# Patient Record
Sex: FEMALE | URBAN - NONMETROPOLITAN AREA
[De-identification: names, ages, dates, MRNs, and addresses within clinical notes are randomized per-mention and may not be internally consistent; named-entity substitution may affect disease eponyms.]

---

## 2020-03-18 ENCOUNTER — IMPORTED ENCOUNTER (OUTPATIENT)
Dept: URBAN - NONMETROPOLITAN AREA CLINIC 1 | Facility: CLINIC | Age: 35
End: 2020-03-18

## 2020-03-18 PROBLEM — H52.13: Noted: 2020-03-18

## 2020-03-18 PROCEDURE — 92004 COMPRE OPH EXAM NEW PT 1/>: CPT

## 2020-03-18 NOTE — PATIENT DISCUSSION
*LASIK:.-  Recc LASIK sx OU.-  Risks and Benefits discussed with patient including infection bleeding loss of vision flap complications mechanical failure and over and under correction. The entire procedure was explained to the patient from start to finish and all pts questions were answered.-Pt desires to proceed with surgery and understands realistic expectations. -Valium RX given to patient in office today by Jay Fitzgerald to schedule procedure task sent to her.

## 2022-04-16 ASSESSMENT — VISUAL ACUITY
OS_SC: 20/20
OD_SC: 20/20
OS_CC: CF4'

## 2022-04-16 ASSESSMENT — TONOMETRY
OD_IOP_MMHG: 14
OS_IOP_MMHG: 14

## 2022-04-16 ASSESSMENT — KERATOMETRY
OD_AXISANGLE_DEGREES: 096
OS_K1POWER_DIOPTERS: 44.23
OD_K1POWER_DIOPTERS: 44.23
OD_K2POWER_DIOPTERS: 44.64
OS_K2POWER_DIOPTERS: 45.30
OS_AXISANGLE_DEGREES: 072

## 2022-04-16 ASSESSMENT — PACHYMETRY
OD_CT_UM: 540
OS_CT_UM: 544